# Patient Record
Sex: MALE | Race: WHITE | NOT HISPANIC OR LATINO | URBAN - METROPOLITAN AREA
[De-identification: names, ages, dates, MRNs, and addresses within clinical notes are randomized per-mention and may not be internally consistent; named-entity substitution may affect disease eponyms.]

---

## 2022-11-10 ENCOUNTER — EMERGENCY (EMERGENCY)
Facility: HOSPITAL | Age: 27
LOS: 1 days | Discharge: HOME | End: 2022-11-10
Attending: EMERGENCY MEDICINE
Payer: SELF-PAY

## 2022-11-10 VITALS
SYSTOLIC BLOOD PRESSURE: 154 MMHG | HEIGHT: 68 IN | DIASTOLIC BLOOD PRESSURE: 101 MMHG | OXYGEN SATURATION: 97 % | HEART RATE: 91 BPM | WEIGHT: 292.33 LBS | TEMPERATURE: 98 F | RESPIRATION RATE: 18 BRPM

## 2022-11-10 VITALS — DIASTOLIC BLOOD PRESSURE: 82 MMHG | SYSTOLIC BLOOD PRESSURE: 143 MMHG

## 2022-11-10 LAB
ANION GAP SERPL CALC-SCNC: 13 MMOL/L — SIGNIFICANT CHANGE UP (ref 5–17)
APPEARANCE UR: ABNORMAL
BACTERIA # UR AUTO: ABNORMAL
BASOPHILS # BLD AUTO: 0.08 K/UL — SIGNIFICANT CHANGE UP (ref 0–0.2)
BASOPHILS NFR BLD AUTO: 0.5 % — SIGNIFICANT CHANGE UP (ref 0–2)
BILIRUB UR-MCNC: NEGATIVE — SIGNIFICANT CHANGE UP
BUN SERPL-MCNC: 10.8 MG/DL — SIGNIFICANT CHANGE UP (ref 8–20)
CALCIUM SERPL-MCNC: 9 MG/DL — SIGNIFICANT CHANGE UP (ref 8.4–10.5)
CHLORIDE SERPL-SCNC: 102 MMOL/L — SIGNIFICANT CHANGE UP (ref 96–108)
CO2 SERPL-SCNC: 25 MMOL/L — SIGNIFICANT CHANGE UP (ref 22–29)
COLOR SPEC: YELLOW — SIGNIFICANT CHANGE UP
CREAT SERPL-MCNC: 1.01 MG/DL — SIGNIFICANT CHANGE UP (ref 0.5–1.3)
DIFF PNL FLD: ABNORMAL
EGFR: 106 ML/MIN/1.73M2 — SIGNIFICANT CHANGE UP
EOSINOPHIL # BLD AUTO: 0.06 K/UL — SIGNIFICANT CHANGE UP (ref 0–0.5)
EOSINOPHIL NFR BLD AUTO: 0.4 % — SIGNIFICANT CHANGE UP (ref 0–6)
EPI CELLS # UR: SIGNIFICANT CHANGE UP
GLUCOSE SERPL-MCNC: 118 MG/DL — HIGH (ref 70–99)
GLUCOSE UR QL: NEGATIVE MG/DL — SIGNIFICANT CHANGE UP
GRAN CASTS # UR COMP ASSIST: ABNORMAL /LPF
HCT VFR BLD CALC: 48.8 % — SIGNIFICANT CHANGE UP (ref 39–50)
HGB BLD-MCNC: 16.6 G/DL — SIGNIFICANT CHANGE UP (ref 13–17)
HIV 1 & 2 AB SERPL IA.RAPID: SIGNIFICANT CHANGE UP
IMM GRANULOCYTES NFR BLD AUTO: 0.5 % — SIGNIFICANT CHANGE UP (ref 0–0.9)
KETONES UR-MCNC: ABNORMAL
LEUKOCYTE ESTERASE UR-ACNC: ABNORMAL
LYMPHOCYTES # BLD AUTO: 15.2 % — SIGNIFICANT CHANGE UP (ref 13–44)
LYMPHOCYTES # BLD AUTO: 2.35 K/UL — SIGNIFICANT CHANGE UP (ref 1–3.3)
MCHC RBC-ENTMCNC: 28.3 PG — SIGNIFICANT CHANGE UP (ref 27–34)
MCHC RBC-ENTMCNC: 34 GM/DL — SIGNIFICANT CHANGE UP (ref 32–36)
MCV RBC AUTO: 83.1 FL — SIGNIFICANT CHANGE UP (ref 80–100)
MONOCYTES # BLD AUTO: 0.85 K/UL — SIGNIFICANT CHANGE UP (ref 0–0.9)
MONOCYTES NFR BLD AUTO: 5.5 % — SIGNIFICANT CHANGE UP (ref 2–14)
NEUTROPHILS # BLD AUTO: 12.03 K/UL — HIGH (ref 1.8–7.4)
NEUTROPHILS NFR BLD AUTO: 77.9 % — HIGH (ref 43–77)
NITRITE UR-MCNC: NEGATIVE — SIGNIFICANT CHANGE UP
PH UR: 5 — SIGNIFICANT CHANGE UP (ref 5–8)
PLATELET # BLD AUTO: 297 K/UL — SIGNIFICANT CHANGE UP (ref 150–400)
POTASSIUM SERPL-MCNC: 4.4 MMOL/L — SIGNIFICANT CHANGE UP (ref 3.5–5.3)
POTASSIUM SERPL-SCNC: 4.4 MMOL/L — SIGNIFICANT CHANGE UP (ref 3.5–5.3)
PROT UR-MCNC: 15
RBC # BLD: 5.87 M/UL — HIGH (ref 4.2–5.8)
RBC # FLD: 13.2 % — SIGNIFICANT CHANGE UP (ref 10.3–14.5)
RBC CASTS # UR COMP ASSIST: >50 /HPF (ref 0–4)
SODIUM SERPL-SCNC: 140 MMOL/L — SIGNIFICANT CHANGE UP (ref 135–145)
SP GR SPEC: 1.02 — SIGNIFICANT CHANGE UP (ref 1.01–1.02)
UROBILINOGEN FLD QL: 1 MG/DL
WBC # BLD: 15.44 K/UL — HIGH (ref 3.8–10.5)
WBC # FLD AUTO: 15.44 K/UL — HIGH (ref 3.8–10.5)
WBC UR QL: SIGNIFICANT CHANGE UP /HPF (ref 0–5)

## 2022-11-10 PROCEDURE — 99285 EMERGENCY DEPT VISIT HI MDM: CPT

## 2022-11-10 PROCEDURE — 86703 HIV-1/HIV-2 1 RESULT ANTBDY: CPT

## 2022-11-10 PROCEDURE — 80048 BASIC METABOLIC PNL TOTAL CA: CPT

## 2022-11-10 PROCEDURE — 74176 CT ABD & PELVIS W/O CONTRAST: CPT | Mod: 26,MA

## 2022-11-10 PROCEDURE — 36415 COLL VENOUS BLD VENIPUNCTURE: CPT

## 2022-11-10 PROCEDURE — 81001 URINALYSIS AUTO W/SCOPE: CPT

## 2022-11-10 PROCEDURE — 85025 COMPLETE CBC W/AUTO DIFF WBC: CPT

## 2022-11-10 PROCEDURE — 74176 CT ABD & PELVIS W/O CONTRAST: CPT | Mod: MA

## 2022-11-10 PROCEDURE — 99283 EMERGENCY DEPT VISIT LOW MDM: CPT | Mod: 25

## 2022-11-10 RX ORDER — TAMSULOSIN HYDROCHLORIDE 0.4 MG/1
1 CAPSULE ORAL
Qty: 14 | Refills: 0
Start: 2022-11-10 | End: 2022-11-23

## 2022-11-10 RX ORDER — IBUPROFEN 200 MG
1 TABLET ORAL
Qty: 28 | Refills: 0
Start: 2022-11-10 | End: 2022-11-16

## 2022-11-10 RX ORDER — SODIUM CHLORIDE 9 MG/ML
1000 INJECTION INTRAMUSCULAR; INTRAVENOUS; SUBCUTANEOUS ONCE
Refills: 0 | Status: COMPLETED | OUTPATIENT
Start: 2022-11-10 | End: 2022-11-10

## 2022-11-10 RX ORDER — KETOROLAC TROMETHAMINE 30 MG/ML
30 SYRINGE (ML) INJECTION ONCE
Refills: 0 | Status: DISCONTINUED | OUTPATIENT
Start: 2022-11-10 | End: 2022-11-10

## 2022-11-10 RX ORDER — OXYCODONE HYDROCHLORIDE 5 MG/1
1 TABLET ORAL
Qty: 8 | Refills: 0
Start: 2022-11-10 | End: 2022-11-11

## 2022-11-10 RX ADMIN — SODIUM CHLORIDE 2000 MILLILITER(S): 9 INJECTION INTRAMUSCULAR; INTRAVENOUS; SUBCUTANEOUS at 12:44

## 2022-11-10 RX ADMIN — Medication 30 MILLIGRAM(S): at 12:44

## 2022-11-10 NOTE — ED STATDOCS - PATIENT PORTAL LINK FT
You can access the FollowMyHealth Patient Portal offered by [facility] by registering at the following website: [email or web address]. By joining [facility]’s FollowMyHealth portal, you will also be able to view your health information using other applications (apps) compatible with our system.

## 2022-11-10 NOTE — ED STATDOCS - PROGRESS NOTE DETAILS
MIRIAM Stiles: Pain completed resolved. No vomiting. MIRIAM Stiles: Patient evaluated by intake physician. HPI/ROS/PE as noted above. Will follow up plan per intake physician and continue to assess patient.

## 2022-11-10 NOTE — ED STATDOCS - ATTENDING APP SHARED VISIT CONTRIBUTION OF CARE
I, Laxmi Gastelum, performed the initial face to face bedside interview with this patient regarding history of present illness, review of symptoms and relevant past medical, social and family history.  I completed an independent physical examination.  I was the initial provider who evaluated this patient. I have signed out the follow up of any pending tests (i.e. labs, radiological studies) to the ACP.  I have communicated the patient’s plan of care and disposition with the ACP.  The history, relevant review of systems, past medical and surgical history, medical decision making, and physical examination was documented by the scribe in my presence and I attest to the accuracy of the documentation.

## 2022-11-10 NOTE — ED STATDOCS - NS ED ATTENDING STATEMENT MOD
This was a shared visit with the PAPO. I reviewed and verified the documentation and independently performed the documented:

## 2022-11-10 NOTE — ED STATDOCS - OBJECTIVE STATEMENT
27 y/o male with no PMHx presents to ED c/o flank pain. Patient reports right flank pain that radiates down into the right testicle that began this morning with associated urinary urgency and frequency    Denies family hx of kidney stones, hematuria, N/V/D, fever, chills  : Yann

## 2022-11-10 NOTE — ED STATDOCS - NSFOLLOWUPINSTRUCTIONS_ED_ALL_ED_FT
- Prescription sent to pharmacy.  - Ibuprofen 600mg every 6 hours as needed for pain.  - Acetaminophen 650mg every 6 hours as needed for pain.   - Increase fluids.   - Please call tomorrow to schedule follow up appointment with urologist.  - Please bring all documentation from your ED visit to any related future follow up appointment.  - Please call to schedule follow up appointment with your primary care physician within 24-48 hours.  - Please seek immediate medical attention for any new/worsening, signs/symptoms, or concerns including but not limited to fever, intractable pain/vomiting.    Feel better!     - Receta enviada a farmacia.  - Ibuprofeno 600mg cada 6 horas según necesidad para el dolor.  - Acetaminofén 650 mg cada 6 horas según sea necesario para el dolor.  - Aumentar los líquidos.  - Llame mañana para programar daniel carey de seguimiento con el urólogo.  - Traiga toda la documentación de mccoy visita al ED a cualquier carey de seguimiento futura relacionada.  - Llame para programar daniel carey de seguimiento con mccoy médico de atención primaria dentro de las 24 a 48 horas.  - Busque atención médica inmediata para cualquier signo/síntoma nuevo/empeoramiento o inquietudes que incluyen, entre otros, fiebre, dolor/vómitos intratables.    ¡Sentirse mejor!      Cálculos renales    Kidney Stones       Los cálculos renales son depósitos sólidos parecidos a piedras que se tu dentro de los riñones. Los riñones son un par de órganos que producen la orina. Un cálculo renal se puede formar en un riñón y desplazarse a otras partes de las vías urinarias, que incluyen los conductos que conectan los riñones con la vejiga (uréteres), la vejiga y el conducto que lleva la orina hacia fuera del cuerpo (uretra). A medida que el cálculo atraviesa estas zonas, puede causar dolor intenso y obstruir el paso de la orina.    Los cálculos renales se tu cuando hay niveles altos de ciertos minerales presentes en la orina. Los cálculos suelen eliminarse del cuerpo a través de la orina, prasad, en algunos casos, se necesita tratamiento médico para eliminarlos.      ¿Cuáles son las causas?    Los cálculos renales pueden ser causados por lo siguiente:  •Daniel afección en la cual ciertas glándulas producen mucha hormona paratiroidea (hiperparatiroidismo primario), lo que causa demasiada acumulación de calcio en la mary.      •Daniel acumulación de kalee de ácido úrico en la vejiga (hiperuricosuria). El ácido úrico es un químico que el cuerpo produce cuando se ingieren determinados alimentos. Suele eliminarse a través de la orina.      •Estrechamiento (estenosis) de un uréter o de ambos.      •Daniel obstrucción en el riñón presente al nacer (obstrucción congénita).      •Daniel cirugía realizada en el riñón o los uréteres, jeremías daniel cirugía de bypass gástrico.        ¿Qué incrementa el riesgo?    Los siguientes factores pueden hacer que sea más propenso a desarrollar esta afección:  •Krista tenido un cálculo renal en el pasado.      •Tener antecedentes familiares de cálculos renales.      •No beber suficiente agua.      •Seguir daniel dieta sebas en proteínas, sal (sodio) o azúcar.      •Tener sobrepeso u obesidad.        ¿Cuáles son los signos o síntomas?    Los síntomas de cálculos renales pueden incluir los siguientes:  •Dolor en el costado del abdomen, mireille debajo de las costillas (dolor en fosa lumbar). Dolor que generalmente se expande (irradia) hacia la jacinto.      •Necesidad de orinar con frecuencia o urgencia.      •Dolor al orinar.      •Mary en la orina (hematuria).      •Náuseas.      •Vómitos.      •Fiebre y escalofríos.        ¿Cómo se diagnostica?    Esta afección se puede diagnosticar en función de lo siguiente:  •Los síntomas y los antecedentes médicos.      •Un examen físico.      •Realizar análisis de mary.      •Análisis de orina. Se pueden realizar antes y después de que el cálculo se elimina del cuerpo a través de la orina.      •Estudios de diagnóstico por imágenes, jeremías daniel exploración por tomografía computarizada (TC), daniel radiografía abdominal o daniel ecografía.      •Daniel intervención para examinar el interior de la vejiga (cistoscopia).        ¿Cómo se trata?    El tratamiento para los cálculos renales depende del tamaño, la ubicación y la composición de los cálculos. Los cálculos renales suelen eliminarse del cuerpo a través de la orina. Puede ser necesario hacer lo siguiente:  •Aumentar la ingesta de líquidos para ayudar a eliminar el cálculo. En algunos casos, pueden administrarle líquidos a través de daniel vía intravenosa y kristi vez deba permanecer bajo observación en el hospital.      •Jennifer analgésicos.      •Hacer cambios en mccoy alimentación para ayudar a prevenir que los cálculos renales regresen.      A veces, se necesitan procedimientos médicos para extraer un cálculo renal. [place] puede incluir lo siguiente:•Un procedimiento para romper los cálculos renales utilizando lo siguiente:  •Un haz de eric concentrado (terapia láser).      •Ondas de choque (litotricia extracorpórea).        •Cirugía para extraer los cálculos renales. [place] será necesario si siente dolor intenso o los cálculos obstruyen las vías urinarias.        Siga estas instrucciones en mccoy casa:    Medicamentos     •[place] los medicamentos de venta sophie y los recetados solamente jeremías se lo haya indicado el médico.      •Pregúntele al médico si el medicamento recetado le impide conducir o usar maquinaria pesada.      Comida y bebida     •Stacey suficiente líquido jeremías para mantener la orina de color amarillo pálido. Es posible que le indiquen que stacey al menos entre 8 y 10 vasos de agua por día. [place] lo ayudará a eliminar el cálculo renal.    •Si se lo indican, modifique la dieta. [place] puede incluir:  •Limitar la ingesta de sodio.      •Harmony más frutas y verduras.      •Limitar la ingesta de proteínas animales, jeremías chris bates, carne de ave, pescado y huevos.        •Siga las instrucciones del médico respecto de las restricciones en las comidas o las bebidas.      Instrucciones generales   •Recoja muestras de orina jeremías se lo haya indicado el médico. Es posible que deba recoger daniel muestra de orina:  •24 horas después de eliminar el cálculo.      •Entre 8 y 12 semanas después de krista eliminado el cálculo renal, y cada 6 a 12 meses después de haberlo eliminado.        •Cuele la orina cada vez que orine según las indicaciones del médico. Use el colador que el médico le haya recomendado.      • No deseche el cálculo renal después de haberlo eliminado. Consérvelo para que el médico pueda analizarlo. Analizar la composición del cálculo renal puede evitar la formación de posibles cálculos renales en el futuro.      •Concurra a todas las visitas de seguimiento jeremías se lo haya indicado el médico. [place] es importante. Es posible que le realicen radiografías o ecografías para asegurarse de que haya eliminado el cálculo.        ¿Cómo se previene?     Para prevenir la formación de otro cálculo renal:  •Stacey suficiente líquido jeremías para mantener la orina de color amarillo pálido. Esta es la mejor manera de prevenir la formación de cálculos renales.      •Siga daniel dieta saludable y las recomendaciones de mccoy médico respecto de los alimentos que debe evitar. Es posible que le indiquen que siga daniel dieta baja en proteínas. Las recomendaciones varían según el tipo de cálculo renal que tenga.      •Mantenga un peso saludable.        Dónde buscar más información    •National Kidney Foundation (NKF) (Fundación Nacional del Ri[employer]): www.kidney.org      •[employer] (UCF) (Fundación de Cuidados Urológicos): www.urologyhealth.org        Comuníquese con un médico si:    •Tiene un dolor que empeora o que no mejora con los medicamentos.        Solicite ayuda inmediatamente si:    •Tiene fiebre o escalofríos.      •Siente dolor intenso.      •Siente dolor abdominal.      •Se desmaya.      •No puede orinar.        Resumen    •Los cálculos renales son depósitos sólidos parecidos a piedras que se tu dentro de los riñones.      •Los cálculos renales pueden causar náuseas, vómitos, mary en la orina, dolor abdominal y necesidad urgente de orinar con frecuencia.      •El tratamiento para los cálculos renales depende del tamaño, la ubicación y la composición de los cálculos. Los cálculos renales suelen eliminarse del cuerpo a través de la orina.      •Los cálculos renales pueden prevenirse bebiendo suficientes líquidos, siguiendo daniel dieta saludable y manteniendo un peso saludable.      Esta información no tiene jeremías fin reemplazar el consejo del médico. Asegúrese de hacerle al médico cualquier pregunta que tenga.

## 2022-11-10 NOTE — ED STATDOCS - CARE PROVIDER_API CALL
Valerio Carrillo)  Urology  [address], [address]  [place] [address]  Phone: [phone]  Fax: [phone]  Follow Up Time:

## 2024-10-19 ENCOUNTER — EMERGENCY (EMERGENCY)
Facility: HOSPITAL | Age: 29
LOS: 1 days | Discharge: HOME | End: 2024-10-19
Attending: EMERGENCY MEDICINE
Payer: SELF-PAY

## 2024-10-19 VITALS
WEIGHT: 298.51 LBS | HEART RATE: 100 BPM | RESPIRATION RATE: 16 BRPM | SYSTOLIC BLOOD PRESSURE: 137 MMHG | OXYGEN SATURATION: 98 % | TEMPERATURE: 98 F | DIASTOLIC BLOOD PRESSURE: 89 MMHG

## 2024-10-19 PROCEDURE — 99284 EMERGENCY DEPT VISIT MOD MDM: CPT

## 2024-10-19 PROCEDURE — 99053 MED SERV 10PM-8AM 24 HR FAC: CPT

## 2024-10-20 PROCEDURE — 99283 EMERGENCY DEPT VISIT LOW MDM: CPT

## 2024-10-20 PROCEDURE — 71046 X-RAY EXAM CHEST 2 VIEWS: CPT

## 2024-10-20 PROCEDURE — 71046 X-RAY EXAM CHEST 2 VIEWS: CPT | Mod: 26

## 2024-10-20 RX ORDER — IBUPROFEN 200 MG
600 TABLET ORAL ONCE
Refills: 0 | Status: COMPLETED | OUTPATIENT
Start: 2024-10-20 | End: 2024-10-20

## 2024-10-20 RX ORDER — METHOCARBAMOL 500 MG/1
1500 TABLET, FILM COATED ORAL ONCE
Refills: 0 | Status: COMPLETED | OUTPATIENT
Start: 2024-10-20 | End: 2024-10-20

## 2024-10-20 RX ORDER — IBUPROFEN 200 MG
1 TABLET ORAL
Qty: 28 | Refills: 0
Start: 2024-10-20 | End: 2024-10-26

## 2024-10-20 RX ORDER — ACETAMINOPHEN 500 MG/5ML
2 LIQUID (ML) ORAL
Qty: 30 | Refills: 0
Start: 2024-10-20 | End: 2024-10-24

## 2024-10-20 RX ORDER — LIDOCAINE HYDROCHLORIDE 20 MG/ML
1 JELLY TOPICAL
Qty: 10 | Refills: 0
Start: 2024-10-20 | End: 2024-10-29

## 2024-10-20 RX ORDER — METHOCARBAMOL 500 MG/1
2 TABLET, FILM COATED ORAL
Qty: 30 | Refills: 0
Start: 2024-10-20 | End: 2024-10-24

## 2024-10-20 RX ORDER — ACETAMINOPHEN 500 MG/5ML
975 LIQUID (ML) ORAL ONCE
Refills: 0 | Status: COMPLETED | OUTPATIENT
Start: 2024-10-20 | End: 2024-10-20

## 2024-10-20 RX ORDER — LIDOCAINE HYDROCHLORIDE 20 MG/ML
1 JELLY TOPICAL ONCE
Refills: 0 | Status: COMPLETED | OUTPATIENT
Start: 2024-10-20 | End: 2024-10-20

## 2024-10-20 RX ORDER — LIDOCAINE HYDROCHLORIDE 20 MG/ML
1 JELLY TOPICAL
Qty: 2 | Refills: 0
Start: 2024-10-20 | End: 2024-10-29

## 2024-10-20 RX ADMIN — Medication 975 MILLIGRAM(S): at 00:48

## 2024-10-20 RX ADMIN — LIDOCAINE HYDROCHLORIDE 1 PATCH: 20 JELLY TOPICAL at 00:48

## 2024-10-20 RX ADMIN — Medication 600 MILLIGRAM(S): at 00:48

## 2024-10-20 RX ADMIN — METHOCARBAMOL 1500 MILLIGRAM(S): 500 TABLET, FILM COATED ORAL at 00:48
